# Patient Record
Sex: MALE | Race: WHITE | NOT HISPANIC OR LATINO | URBAN - METROPOLITAN AREA
[De-identification: names, ages, dates, MRNs, and addresses within clinical notes are randomized per-mention and may not be internally consistent; named-entity substitution may affect disease eponyms.]

---

## 2017-09-14 ENCOUNTER — IMPORTED ENCOUNTER (OUTPATIENT)
Dept: URBAN - METROPOLITAN AREA CLINIC 50 | Facility: CLINIC | Age: 77
End: 2017-09-14

## 2017-10-09 ENCOUNTER — IMPORTED ENCOUNTER (OUTPATIENT)
Dept: URBAN - METROPOLITAN AREA CLINIC 50 | Facility: CLINIC | Age: 77
End: 2017-10-09

## 2019-04-01 ENCOUNTER — IMPORTED ENCOUNTER (OUTPATIENT)
Dept: URBAN - METROPOLITAN AREA CLINIC 50 | Facility: CLINIC | Age: 79
End: 2019-04-01

## 2019-04-22 ENCOUNTER — IMPORTED ENCOUNTER (OUTPATIENT)
Dept: URBAN - METROPOLITAN AREA CLINIC 50 | Facility: CLINIC | Age: 79
End: 2019-04-22

## 2020-04-20 ENCOUNTER — IMPORTED ENCOUNTER (OUTPATIENT)
Dept: URBAN - METROPOLITAN AREA CLINIC 50 | Facility: CLINIC | Age: 80
End: 2020-04-20

## 2021-04-09 ENCOUNTER — PREPPED CHART (OUTPATIENT)
Dept: URBAN - METROPOLITAN AREA CLINIC 49 | Facility: CLINIC | Age: 81
End: 2021-04-09

## 2021-04-09 ASSESSMENT — VISUAL ACUITY
OS_GLARE: 20/70
OS_SC: 20/25
OS_GLARE: 20/40
OD_SC: 20/30-1
OD_GLARE: 20/40
OS_SC: J1
OS_PH: 20/25+
OU_SC: 20/25
OU_SC: J1
OD_PH: 20/30+2
OD_GLARE: 20/60
OD_SC: J1

## 2021-04-09 ASSESSMENT — TONOMETRY
OS_IOP_MMHG: 14
OD_IOP_MMHG: 15

## 2021-04-17 ASSESSMENT — VISUAL ACUITY
OD_OTHER: 20/25. 20/30.
OD_SC: 20/25+
OD_BAT: 20/25
OS_PH: @ 15 IN
OS_BAT: 20/30
OD_OTHER: 20/40. 20/60.
OD_SC: 20/25-
OS_BAT: 20/40
OS_OTHER: 20/30. 20/50.
OD_CC: J1@ 15 IN
OD_SC: 20/30-1
OD_BAT: 20/40
OD_CC: J1+@ 21 IN
OS_PH: 20/25+
OD_PH: @ 15 IN
OS_SC: 20/30-
OS_SC: 20/25
OD_PH: 20/30+2
OS_CC: J1@ 15 IN
OS_PH: 20/30
OS_BAT: 20/40
OS_CC: J1+@ 21 IN
OS_SC: 20/20-1
OD_OTHER: 20/25. 20/30.
OS_OTHER: 20/40. 20/50.
OD_SC: 20/25-1
OS_SC: 20/50-1
OD_CC: J1+@ 14 IN
OS_CC: J1+@ 14 IN
OS_OTHER: 20/40. 20/70.
OD_BAT: 20/25
OS_PH: 20/25

## 2021-04-17 ASSESSMENT — PACHYMETRY
OD_CT_UM: 565
OS_CT_UM: 562
OS_CT_UM: 562
OD_CT_UM: 565
OS_CT_UM: 562
OS_CT_UM: 562
OD_CT_UM: 565
OS_CT_UM: 562
OD_CT_UM: 565
OD_CT_UM: 565

## 2021-04-17 ASSESSMENT — TONOMETRY
OD_IOP_MMHG: 15
OS_IOP_MMHG: 15
OD_IOP_MMHG: 16
OD_IOP_MMHG: 15
OD_IOP_MMHG: 16
OD_IOP_MMHG: 15
OS_IOP_MMHG: 15
OD_IOP_MMHG: 15
OD_IOP_MMHG: 14
OS_IOP_MMHG: 16
OS_IOP_MMHG: 15
OD_IOP_MMHG: 14
OS_IOP_MMHG: 16
OS_IOP_MMHG: 15
OS_IOP_MMHG: 16
OS_IOP_MMHG: 14

## 2021-04-20 ENCOUNTER — PREPPED CHART (OUTPATIENT)
Dept: URBAN - METROPOLITAN AREA CLINIC 49 | Facility: CLINIC | Age: 81
End: 2021-04-20

## 2021-06-11 NOTE — PATIENT DISCUSSION
COUNSELING:  I have talked with the patient about my impressions, explained our treatment plan, and have answered all questions.